# Patient Record
Sex: MALE | Race: WHITE | NOT HISPANIC OR LATINO | Employment: FULL TIME | URBAN - METROPOLITAN AREA
[De-identification: names, ages, dates, MRNs, and addresses within clinical notes are randomized per-mention and may not be internally consistent; named-entity substitution may affect disease eponyms.]

---

## 2017-07-22 ENCOUNTER — HOSPITAL ENCOUNTER (EMERGENCY)
Facility: HOSPITAL | Age: 27
Discharge: HOME | End: 2017-07-23
Attending: EMERGENCY MEDICINE

## 2017-07-22 DIAGNOSIS — R52 PAIN: ICD-10-CM

## 2017-07-22 DIAGNOSIS — M25.571 ACUTE RIGHT ANKLE PAIN: Primary | ICD-10-CM

## 2017-07-22 PROCEDURE — 29515 APPLICATION SHORT LEG SPLINT: CPT

## 2017-07-22 PROCEDURE — 99283 EMERGENCY DEPT VISIT LOW MDM: CPT | Mod: 25

## 2017-07-23 VITALS
OXYGEN SATURATION: 99 % | TEMPERATURE: 98 F | HEART RATE: 78 BPM | WEIGHT: 230 LBS | RESPIRATION RATE: 16 BRPM | SYSTOLIC BLOOD PRESSURE: 142 MMHG | DIASTOLIC BLOOD PRESSURE: 78 MMHG | HEIGHT: 75 IN | BODY MASS INDEX: 28.6 KG/M2

## 2017-07-23 PROCEDURE — 25000003 PHARM REV CODE 250: Performed by: EMERGENCY MEDICINE

## 2017-07-23 RX ORDER — HYDROCODONE BITARTRATE AND ACETAMINOPHEN 10; 325 MG/1; MG/1
1 TABLET ORAL EVERY 4 HOURS PRN
Qty: 18 TABLET | Refills: 0 | Status: SHIPPED | OUTPATIENT
Start: 2017-07-23 | End: 2018-02-01

## 2017-07-23 RX ORDER — HYDROCODONE BITARTRATE AND ACETAMINOPHEN 10; 325 MG/1; MG/1
1 TABLET ORAL
Status: COMPLETED | OUTPATIENT
Start: 2017-07-23 | End: 2017-07-23

## 2017-07-23 RX ADMIN — HYDROCODONE BITARTRATE AND ACETAMINOPHEN 1 TABLET: 10; 325 TABLET ORAL at 12:07

## 2017-07-23 NOTE — ED NOTES
Pt to ER with c/o R foot/ankle pain onset earlier yesterday.  Pt evaluated at [facility] earlier tonight.  Here for secondopinion.

## 2017-07-23 NOTE — ED PROVIDER NOTES
SCRIBE #1 NOTE: I, Elias Garcia, am scribing for, and in the presence of, Daniele Horowitz MD. I have scribed the entire note.      History      Chief Complaint   Patient presents with    Ankle Pain     right ankle pain today. Was seen at [facility] earlier but symptoms have gotten worse       Review of patient's allergies indicates:   Allergen Reactions    Keflex [cephalexin]     Pcn [penicillins]         HPI   HPI    7/22/2017, 11:37 PM   History obtained from the patient      History of Present Illness: Reji Talbert Jr. is a 27 y.o. male patient who presents to the Emergency Department nothing for an evaluation of right ankle pain which onset suddenly today round 1400. Pt reports he was evaluated earlier today at [facility], but since then he states his sx have worsened. Symptoms are constant and moderate in severity. Exacerbated by nothing and relieved by nothing. Patient denies any weakness, numbness, decreased ROM, and all other sxs at this time. No further complaints or concerns at this time.     Arrival mode: Personal vehicle    PCP: Primary Doctor No       Past Medical History:  Past Medical History:   Diagnosis Date    Asthma     childhood    Heart attack     Tobacco use        Past Surgical History:  Past Surgical History:   Procedure Laterality Date    ADENOIDECTOMY      ANKLE SURGERY      TONSILLECTOMY           Family History:  Family History   Problem Relation Age of Onset    Hypertension      Coronary artery disease         Social History:  Social History     Social History Main Topics    Smoking status: Current Every Day Smoker     Packs/day: 1.00     Types: Cigarettes    Smokeless tobacco: Unknown    Alcohol use Yes    Drug use: No    Sexual activity: Yes     Partners: Female       ROS   Review of Systems   Constitutional: Negative for chills and fever.   HENT: Negative for sore throat.    Respiratory: Negative for shortness of breath.    Cardiovascular: Negative for chest pain.    Gastrointestinal: Negative for abdominal pain, nausea and vomiting.   Genitourinary: Negative for dysuria and hematuria.   Musculoskeletal: Positive for arthralgias (Right ankle). Negative for back pain, gait problem, joint swelling, neck pain and neck stiffness.   Skin: Negative for rash.   Neurological: Negative for dizziness, weakness, light-headedness, numbness and headaches.   Hematological: Does not bruise/bleed easily.     Physical Exam      Initial Vitals [07/22/17 2325]   BP Pulse Resp Temp SpO2   (!) 165/81 82 18 97.9 °F (36.6 °C) 100 %      MAP       109          Physical Exam  Nursing Notes and Vital Signs Reviewed.  Constitutional: Patient is in no acute distress. Well-developed and well-nourished.  Head: Atraumatic. Normocephalic.  Eyes: PERRL. EOM intact. Conjunctivae are not pale. No scleral icterus.  ENT: Mucous membranes are moist. Oropharynx is clear and symmetric.    Neck: Supple. Full ROM. No lymphadenopathy.  Cardiovascular: Regular rate. Regular rhythm.  Pulmonary/Chest: No respiratory distress. Clear to auscultation bilaterally. No wheezing, rales, or rhonchi.  Abdominal: Soft and non-distended.  There is no tenderness.  Musculoskeletal: Moves all extremities. No obvious deformities.  Right Ankle:  Swelling to lateral malleolus with diffuse tenderness laterally. Decreased ROM secondary to pain noted.  Intact sensation to light touch. Distal capillary refill takes less than 2 seconds.  PT and DP pulses are 2+ bilaterally.  Skin: Warm and dry.  Neurological:  Alert, awake, and appropriate.  Normal speech.  No acute focal neurological deficits are appreciated.  Psychiatric: Normal affect. Good eye contact. Appropriate in content.    ED Course    Splint Application  Date/Time: 7/23/2017 12:48 AM  Performed by: WILI BARONE.  Authorized by: WILI BARONE.   Location details: right ankle  Splint type: Walking boot.  Post-procedure: The splinted body part was neurovascularly unchanged following  "the procedure.  Patient tolerance: Patient tolerated the procedure well with no immediate complications        ED Vital Signs:  Vitals:    07/22/17 2325 07/23/17 0106   BP: (!) 165/81 (!) 142/78   Pulse: 82 78   Resp: 18 16   Temp: 97.9 °F (36.6 °C) 98 °F (36.7 °C)   TempSrc: Oral Oral   SpO2: 100% 99%   Weight: 104.3 kg (230 lb)    Height: 6' 3" (1.905 m)        Imaging Results:    Per [facility] radiology, pt's X-ray right ankle results: No acute findings.          The Emergency Provider reviewed the vital signs and test results, which are outlined above.    ED Discussion     12:45 PM: Reassessed pt at this time. Pt is awake, alert, and in NAD. Pt states his condition has  at this time. Discussed with pt all pertinent ED information and results. Discussed pt dx of right ankle pain and plan of tx. Gave pt all f/u and return to the ED instructions. All questions and concerns were addressed at this time. Pt expresses understanding of information and instructions, and is comfortable with plan to discharge. Pt is stable for discharge.    I discussed with patient and/or family/caretaker that evaluation in the ED does not suggest any emergent or life threatening medical conditions requiring immediate intervention beyond what was provided in the ED, and I believe patient is safe for discharge.  Regardless, an unremarkable evaluation in the ED does not preclude the development or presence of a serious of life threatening condition. As such, patient was instructed to return immediately for any worsening or change in current symptoms.      ED Medication(s):  Medications   hydrocodone-acetaminophen 10-325mg per tablet 1 tablet (1 tablet Oral Given 7/23/17 0055)       Discharge Medication List as of 7/23/2017 12:50 AM      START taking these medications    Details   hydrocodone-acetaminophen 10-325mg (NORCO)  mg Tab Take 1 tablet by mouth every 4 (four) hours as needed for Pain., Starting Sun 7/23/2017, Print       "       Follow-up Information     [facility] In 2 days.    Specialty:  Internal Medicine  Contact information:  [address]  [facility] [address] [phone]  Additional information:  (off [facility]) 1st floor           [facility]    Specialty:  Emergency Medicine  Why:  If symptoms worsen  Contact information:  [address]  [facility] [address] [phone]                   Medical Decision Making    Medical Decision Making:   Clinical Tests:   Radiological Study: Ordered and Reviewed           Scribe Attestation:   Scribe #1: I performed the above scribed service and the documentation accurately describes the services I performed. I attest to the accuracy of the note.    Attending:   Physician Attestation Statement for Scribe #1: I, Daniele Horowitz MD, personally performed the services described in this documentation, as scribed by Elias Garcia, in my presence, and it is both accurate and complete.          Clinical Impression       ICD-10-CM ICD-9-CM   1. Acute right ankle pain M25.571 719.47     338.19   2. Pain R52 780.96       Disposition:   Disposition: Discharged  Condition: Stable         Daniele Horowitz MD  07/24/17 0519

## 2017-09-13 ENCOUNTER — HOSPITAL ENCOUNTER (EMERGENCY)
Facility: HOSPITAL | Age: 27
Discharge: HOME | End: 2017-09-13

## 2017-09-13 VITALS
OXYGEN SATURATION: 100 % | BODY MASS INDEX: 28.85 KG/M2 | SYSTOLIC BLOOD PRESSURE: 135 MMHG | WEIGHT: 232 LBS | DIASTOLIC BLOOD PRESSURE: 68 MMHG | RESPIRATION RATE: 18 BRPM | HEART RATE: 69 BPM | HEIGHT: 75 IN | TEMPERATURE: 98 F

## 2017-09-13 DIAGNOSIS — L02.31 ABSCESS OF BUTTOCK, LEFT: Primary | ICD-10-CM

## 2017-09-13 PROCEDURE — 99283 EMERGENCY DEPT VISIT LOW MDM: CPT

## 2017-09-13 RX ORDER — SULFAMETHOXAZOLE AND TRIMETHOPRIM 800; 160 MG/1; MG/1
1 TABLET ORAL
COMMUNITY

## 2017-09-13 NOTE — ED PROVIDER NOTES
"SCRIBE #1 NOTE: I, Elias Jose, am scribing for, and in the presence of, JIMMIE Diop. I have scribed the entire note.      History      Chief Complaint   Patient presents with    Abscess     L buttocks, pt states it was lanced at [facility] but it has gotten worse and "outside of the lines" they thor       Review of patient's allergies indicates:   Allergen Reactions    Keflex [cephalexin] Rash    Pcn [penicillins] Rash        HPI   HPI    9/13/2017, 5:15 PM   History obtained from the patient      History of Present Illness: Reji Talbert Jr. is a 27 y.o. male patient who is 2 days s/p I & D of a skin abscess to his left buttock, done at [facility]. He is currently on Bactrim DS. He presents to the Emergency Department today requesting wound re-check. He states that there is an area of redness that may be beyond the marked border, so he became concerned that the infection may be spreading. He states that he thought that it was healing well until now, because the pain, redness, and swelling seemed to be subsiding. Pt denies any fever, chills, increased warmth/erythema/swelling to the area, drainage from the site, numbness/tingling as well as any other sx.       Arrival mode: Personal vehicle    PCP: Primary Doctor No       Past Medical History:  Past Medical History:   Diagnosis Date    Asthma     childhood    Heart attack     Tobacco use        Past Surgical History:  Past Surgical History:   Procedure Laterality Date    ADENOIDECTOMY      ANKLE SURGERY      TONSILLECTOMY           Family History:  Family History   Problem Relation Age of Onset    Hypertension      Coronary artery disease         Social History:  Social History     Social History Main Topics    Smoking status: Current Every Day Smoker     Packs/day: 1.00     Types: Cigarettes    Smokeless tobacco: Unknown    Alcohol use Yes    Drug use: No    Sexual activity: Yes     Partners: Female       ROS   Review of Systems "   Constitutional: Negative for chills, fever and unexpected weight change.   HENT: Negative for facial swelling and sore throat.    Respiratory: Negative for cough, shortness of breath, wheezing and stridor.    Cardiovascular: Negative for chest pain.   Gastrointestinal: Negative for abdominal pain, constipation, diarrhea, nausea, rectal pain and vomiting.   Endocrine: Negative for polydipsia and polyuria.   Musculoskeletal: Negative for arthralgias and myalgias.   Skin: Positive for wound.        (+) Abscess  (-) Swelling  (-) Drainage   Allergic/Immunologic: Negative for immunocompromised state.   Neurological: Negative for dizziness, weakness, light-headedness, numbness and headaches.   Hematological: Negative for adenopathy.   Psychiatric/Behavioral: Negative for confusion.     Physical Exam      Initial Vitals [09/13/17 1629]   BP Pulse Resp Temp SpO2   135/68 69 18 98.2 °F (36.8 °C) 100 %      MAP       90.33          Physical Exam  Nursing Notes and Vital Signs Reviewed.  Constitutional: Patient is in no acute distress. Well-developed and well-nourished.  Head: Normocephalic.  ENT: Mucous membranes are moist.   Neck: Supple  Cardiovascular: Regular rate. Regular rhythm.   Pulmonary/Chest: No respiratory distress.   Abdominal: Soft and non-tender   Musculoskeletal: Moves all extremities.  Skin: Warm and dry. Sub-acute, nearly healed, superficial cutaneous abscess s/p I & D, no packing present, located to left mid buttock. Measures less than 2 cm. No fluctuance, induration, cellulitis, or lymphangitis. No drainage. No pilonidal. No perirectal or perianal abscess. No necrosis.   Neurological:  Alert, awake, and appropriate. Normal speech. No acute focal neurological deficits are appreciated.  Psychiatric: Normal affect. Good eye contact. Appropriate in content.    ED Course    Procedures  ED Vital Signs:  Vitals:    09/13/17 1629   BP: 135/68   Pulse: 69   Resp: 18   Temp: 98.2 °F (36.8 °C)   TempSrc: Oral  "  SpO2: 100%   Weight: 105.2 kg (232 lb)   Height: 6' 3" (1.905 m)            The Emergency Provider reviewed the vital signs and test results, which are outlined above.    ED Discussion      5:35 PM: Reassessed pt at this time. Pt is awake, alert, and in NAD. Discussed with pt all pertinent ED information. Discussed pt dx of abscess of left buttock and plan of tx. Gave pt all f/u and return to the ED instructions. All questions and concerns were addressed at this time. Pt expresses understanding of information and instructions, and is comfortable with plan to discharge. Pt is stable for discharge.    I discussed wound care precautions with patient and/or family/caretaker; specifically that all wounds have risk of infection despite efforts to cleanse and debride the wound; and there is a risk of an occult foreign body (and thus increased risk of infection) despite a negative examination.  I discussed with patient need to return for any signs of infection, specifically redness, increased pain, fever, drainage of pus, or any concern, immediately.      ED Medication(s):  Medications - No data to display    New Prescriptions    No medications on file       Follow-up Information     [facility]    Specialty:  Emergency Medicine  Why:  If symptoms worsen in any way. Continue Bactrim as directed. Keep skin clean and dry. Follow up with primary care in the next 1-2 days for re-check.  Contact information:  [address] [facility] [address] [phone]                   Medical Decision Making              Scribe Attestation:   Scribe #1: I performed the above scribed service and the documentation accurately describes the services I performed. I attest to the accuracy of the note.    Attending:   Physician Attestation Statement for Scribe #1: I, JIMMIE Diop, personally performed the services described in this documentation, as scribed by Elias Garcia, in my presence, and " it is both accurate and complete.          Clinical Impression       ICD-10-CM ICD-9-CM   1. Abscess of buttock, left L02.31 682.5       Disposition:   Disposition: Discharged  Condition: Stable         Antonio Lafleur PA-C  09/13/17 5712

## 2018-01-29 ENCOUNTER — HOSPITAL ENCOUNTER (EMERGENCY)
Facility: HOSPITAL | Age: 28
Discharge: HOME | End: 2018-01-29
Attending: EMERGENCY MEDICINE

## 2018-01-29 VITALS
WEIGHT: 215 LBS | OXYGEN SATURATION: 100 % | TEMPERATURE: 99 F | RESPIRATION RATE: 18 BRPM | BODY MASS INDEX: 26.87 KG/M2 | HEART RATE: 67 BPM | SYSTOLIC BLOOD PRESSURE: 120 MMHG | DIASTOLIC BLOOD PRESSURE: 78 MMHG

## 2018-01-29 DIAGNOSIS — J02.0 STREP PHARYNGITIS: Primary | ICD-10-CM

## 2018-01-29 DIAGNOSIS — J06.9 UPPER RESPIRATORY TRACT INFECTION, UNSPECIFIED TYPE: ICD-10-CM

## 2018-01-29 LAB
FLUAV AG SPEC QL IA: NEGATIVE
FLUBV AG SPEC QL IA: NEGATIVE
SPECIMEN SOURCE: NORMAL

## 2018-01-29 PROCEDURE — 99283 EMERGENCY DEPT VISIT LOW MDM: CPT

## 2018-01-29 PROCEDURE — 87400 INFLUENZA A/B EACH AG IA: CPT | Mod: 59

## 2018-01-29 RX ORDER — PREDNISONE 20 MG/1
40 TABLET ORAL DAILY
Qty: 10 TABLET | Refills: 0 | Status: SHIPPED | OUTPATIENT
Start: 2018-01-29 | End: 2018-02-03

## 2018-01-29 RX ORDER — AZITHROMYCIN 250 MG/1
250 TABLET, FILM COATED ORAL DAILY
Qty: 6 TABLET | Refills: 0 | Status: SHIPPED | OUTPATIENT
Start: 2018-01-29 | End: 2018-09-23 | Stop reason: ALTCHOICE

## 2018-01-29 NOTE — ED PROVIDER NOTES
SCRIBE #1 NOTE: I, Gage Torres, am scribing for, and in the presence of, Dom Recio Jr., MD. I have scribed the entire note.      History      Chief Complaint   Patient presents with    Fever     cough, fever, boday aches, vomiting and diarrhea       Review of patient's allergies indicates:   Allergen Reactions    Keflex [cephalexin] Rash    Pcn [penicillins] Rash        HPI   HPI    1/29/2018, 8:03 AM   History obtained from the patient      History of Present Illness: Reji Talbert Jr. is a 27 y.o. male patient who presents to the Emergency Department for fever (TMAX 101.4) which onset gradually 3 days ago. Symptoms are intermittent and moderate in severity. Sx are exacerbated by nothing and relieved with tylenol/ibuprofen. Associated sxs include N/V/D and sore throat. Pt reports taking imodium and OTC medications for diarrhea and pt reports no relief. Pt reports taking 500 mg of tylenol 30 minutes PTA. No other sxs reported. Pt states his wife and son had the same sxs at home last week. Patient denies any chills, abd pain, hematochezia, hematemesis, difficulty swallowing, dizziness, lightheadedness, dysuria, difficulty urinating, cough, congestion, CP, SOB and all other sxs at this time. No further complaints or concerns at this time.     Arrival mode: Personal vehicle     PCP: Primary Doctor No       Past Medical History:  Past Medical History:   Diagnosis Date    Asthma     childhood    Heart attack     Tobacco use        Past Surgical History:  Past Surgical History:   Procedure Laterality Date    ADENOIDECTOMY      ANKLE SURGERY      TONSILLECTOMY           Family History:  Family History   Problem Relation Age of Onset    Hypertension      Coronary artery disease         Social History:  Social History     Social History Main Topics    Smoking status: Current Every Day Smoker     Packs/day: 1.00     Types: Cigarettes    Smokeless tobacco: Never Used    Alcohol use Yes    Drug  use: No    Sexual activity: Yes     Partners: Female       ROS   Review of Systems   Constitutional: Positive for fever. Negative for chills.   HENT: Positive for sore throat. Negative for congestion, ear discharge, ear pain, facial swelling, rhinorrhea, sinus pressure and trouble swallowing.    Respiratory: Negative for chest tightness and shortness of breath.    Cardiovascular: Negative for chest pain.   Gastrointestinal: Positive for diarrhea, nausea and vomiting. Negative for abdominal pain and constipation.   Genitourinary: Negative for dysuria.   Musculoskeletal: Negative for back pain and neck pain.   Skin: Negative for rash.   Neurological: Negative for dizziness, weakness, light-headedness, numbness and headaches.   Hematological: Does not bruise/bleed easily.   Psychiatric/Behavioral: Negative for agitation and confusion.   All other systems reviewed and are negative.      Physical Exam      Initial Vitals [01/29/18 0758]   BP Pulse Resp Temp SpO2   125/85 74 18 98.4 °F (36.9 °C) 100 %      MAP       98.33          Physical Exam  Nursing Notes and Vital Signs Reviewed.  Constitutional: Patient is in no apparent distress. Well-developed and well-nourished.  Head: Atraumatic. Normocephalic.  Eyes: PERRL. EOM intact. Conjunctivae are not pale. No scleral icterus.  ENT: Mucous membranes are moist. Oropharynx is clear and symmetric.    Neck: Supple. Full ROM. No lymphadenopathy.  Cardiovascular: Regular rate. Regular rhythm. No murmurs, rubs, or gallops. Distal pulses are 2+ and symmetric.  Pulmonary/Chest: No respiratory distress. Clear to auscultation bilaterally. No wheezing or rales.  Abdominal: Soft and non-distended.  There is no tenderness.  No rebound, guarding, or rigidity. Good bowel sounds.  Musculoskeletal: Moves all extremities. No obvious deformities. No edema. No calf tenderness.  Skin: Warm and dry.  Neurological:  Alert, awake, and appropriate.  Normal speech.  No acute focal neurological  deficits are appreciated.  Psychiatric: Normal affect. Good eye contact. Appropriate in content.    ED Course    Procedures  ED Vital Signs:  Vitals:    01/29/18 0758 01/29/18 0850 01/29/18 0857   BP: 125/85 120/78    Pulse: 74 67    Resp: 18     Temp: 98.4 °F (36.9 °C)  98.5 °F (36.9 °C)   TempSrc: Oral  Oral   SpO2: 100% 100%    Weight: 97.5 kg (215 lb)         Abnormal Lab Results:  Labs Reviewed   INFLUENZA A AND B ANTIGEN        All Lab Results:  Results for orders placed or performed during the hospital encounter of 01/29/18   Influenza antigen Nasopharyngeal Swab   Result Value Ref Range    Influenza A Ag, EIA Negative Negative    Influenza B Ag, EIA Negative Negative    Flu A & B Source Nasopharyngeal Swab          Imaging Results:  Imaging Results    None                 The Emergency Provider reviewed the vital signs and test results, which are outlined above.    ED Discussion     10:15 AM: Reassessed pt at this time. Pt is laying comfortably in ED bed and in NAD. Pt is awake, alert, and oriented. Discussed with pt all pertinent ED information and results. Discussed pt dx and plan of tx. Gave pt all f/u and return to the ED instructions. All questions and concerns were addressed at this time. Pt expresses understanding of information and instructions, and is comfortable with plan to discharge. Pt is stable for discharge.      I discussed with patient and/or family/caretaker that evaluation in the ED does not suggest any emergent or life threatening medical conditions requiring immediate intervention beyond what was provided in the ED, and I believe patient is safe for discharge.  Regardless, an unremarkable evaluation in the ED does not preclude the development or presence of a serious of life threatening condition. As such, patient was instructed to return immediately for any worsening or change in current symptoms.        ED Medication(s):  Medications - No data to display    Discharge Medication List as  of 1/29/2018  8:47 AM      START taking these medications    Details   azithromycin (Z-MAYRA) 250 MG tablet Take 1 tablet (250 mg total) by mouth once daily. Take first 2 tablets together, then 1 every day until finished., Starting Mon 1/29/2018, Print      predniSONE (DELTASONE) 20 MG tablet Take 2 tablets (40 mg total) by mouth once daily., Starting Mon 1/29/2018, Until Sat 2/3/2018, Print             Follow-up Information     PCP. Call in 2 days.                   Medical Decision Making    Medical Decision Making:   Clinical Tests:   Lab Tests: Ordered and Reviewed           Scribe Attestation:   Scribe #1: I performed the above scribed service and the documentation accurately describes the services I performed. I attest to the accuracy of the note.    Attending:   Physician Attestation Statement for Scribe #1: I, Dom Recio Jr., MD, personally performed the services described in this documentation, as scribed by Gage Torres, in my presence, and it is both accurate and complete.          Clinical Impression       ICD-10-CM ICD-9-CM   1. Strep pharyngitis J02.0 034.0   2. Upper respiratory tract infection, unspecified type J06.9 465.9       Disposition:   Disposition: Discharged  Condition: Stable         Dom Recio Jr., MD  01/29/18 2975

## 2018-01-31 PROCEDURE — 99284 EMERGENCY DEPT VISIT MOD MDM: CPT | Mod: 25

## 2018-01-31 PROCEDURE — 96374 THER/PROPH/DIAG INJ IV PUSH: CPT

## 2018-02-01 ENCOUNTER — HOSPITAL ENCOUNTER (EMERGENCY)
Facility: HOSPITAL | Age: 28
Discharge: HOME | End: 2018-02-01
Attending: EMERGENCY MEDICINE

## 2018-02-01 VITALS
HEART RATE: 75 BPM | WEIGHT: 212 LBS | OXYGEN SATURATION: 100 % | RESPIRATION RATE: 18 BRPM | BODY MASS INDEX: 26.36 KG/M2 | HEIGHT: 75 IN | DIASTOLIC BLOOD PRESSURE: 74 MMHG | TEMPERATURE: 99 F | SYSTOLIC BLOOD PRESSURE: 132 MMHG
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
## 2018-02-01 DIAGNOSIS — T07.XXXA CONTUSION, MULTIPLE SITES: ICD-10-CM

## 2018-02-01 LAB
ALBUMIN SERPL BCP-MCNC: 4.2 G/DL
ALP SERPL-CCNC: 55 U/L
ALT SERPL W/O P-5'-P-CCNC: 30 U/L
ANION GAP SERPL CALC-SCNC: 10 MMOL/L
APTT BLDCRRT: 24.9 SEC
AST SERPL-CCNC: 49 U/L
BASOPHILS # BLD AUTO: 0.01 K/UL
BASOPHILS NFR BLD: 0.1 %
BILIRUB SERPL-MCNC: 0.5 MG/DL
BUN SERPL-MCNC: 18 MG/DL
CALCIUM SERPL-MCNC: 9.7 MG/DL
CHLORIDE SERPL-SCNC: 105 MMOL/L
CO2 SERPL-SCNC: 24 MMOL/L
CREAT SERPL-MCNC: 1.2 MG/DL
DIFFERENTIAL METHOD: ABNORMAL
EOSINOPHIL # BLD AUTO: 0 K/UL
EOSINOPHIL NFR BLD: 0.2 %
ERYTHROCYTE [DISTWIDTH] IN BLOOD BY AUTOMATED COUNT: 14.1 %
EST. GFR  (AFRICAN AMERICAN): >60 ML/MIN/1.73 M^2
EST. GFR  (NON AFRICAN AMERICAN): >60 ML/MIN/1.73 M^2
GLUCOSE SERPL-MCNC: 106 MG/DL
HCT VFR BLD AUTO: 41.4 %
HGB BLD-MCNC: 14.6 G/DL
INR PPP: 1
LYMPHOCYTES # BLD AUTO: 1.8 K/UL
LYMPHOCYTES NFR BLD: 14.3 %
MCH RBC QN AUTO: 30.5 PG
MCHC RBC AUTO-ENTMCNC: 35.3 G/DL
MCV RBC AUTO: 86 FL
MONOCYTES # BLD AUTO: 0.7 K/UL
MONOCYTES NFR BLD: 5.7 %
NEUTROPHILS # BLD AUTO: 10.2 K/UL
NEUTROPHILS NFR BLD: 79.7 %
PLATELET # BLD AUTO: 181 K/UL
PMV BLD AUTO: 9.8 FL
POTASSIUM SERPL-SCNC: 4.1 MMOL/L
PROT SERPL-MCNC: 7 G/DL
PROTHROMBIN TIME: 10.1 SEC
RBC # BLD AUTO: 4.79 M/UL
SODIUM SERPL-SCNC: 139 MMOL/L
WBC # BLD AUTO: 12.84 K/UL

## 2018-02-01 PROCEDURE — 25500020 PHARM REV CODE 255: Performed by: EMERGENCY MEDICINE

## 2018-02-01 PROCEDURE — 80053 COMPREHEN METABOLIC PANEL: CPT

## 2018-02-01 PROCEDURE — 85610 PROTHROMBIN TIME: CPT

## 2018-02-01 PROCEDURE — 63600175 PHARM REV CODE 636 W HCPCS: Performed by: EMERGENCY MEDICINE

## 2018-02-01 PROCEDURE — 25000003 PHARM REV CODE 250: Performed by: EMERGENCY MEDICINE

## 2018-02-01 PROCEDURE — 85730 THROMBOPLASTIN TIME PARTIAL: CPT

## 2018-02-01 PROCEDURE — 85025 COMPLETE CBC W/AUTO DIFF WBC: CPT

## 2018-02-01 RX ORDER — HYDROCODONE BITARTRATE AND ACETAMINOPHEN 7.5; 325 MG/1; MG/1
1 TABLET ORAL EVERY 4 HOURS PRN
Qty: 15 TABLET | Refills: 0 | Status: SHIPPED | OUTPATIENT
Start: 2018-02-01 | End: 2018-09-23 | Stop reason: ALTCHOICE

## 2018-02-01 RX ORDER — KETOROLAC TROMETHAMINE 30 MG/ML
30 INJECTION, SOLUTION INTRAMUSCULAR; INTRAVENOUS
Status: COMPLETED | OUTPATIENT
Start: 2018-02-01 | End: 2018-02-01

## 2018-02-01 RX ORDER — NAPROXEN 375 MG/1
375 TABLET ORAL 2 TIMES DAILY WITH MEALS
Qty: 14 TABLET | Refills: 0 | Status: SHIPPED | OUTPATIENT
Start: 2018-02-01 | End: 2018-09-23 | Stop reason: ALTCHOICE

## 2018-02-01 RX ADMIN — SODIUM CHLORIDE 1000 ML: 0.9 INJECTION, SOLUTION INTRAVENOUS at 02:02

## 2018-02-01 RX ADMIN — IOHEXOL 75 ML: 350 INJECTION, SOLUTION INTRAVENOUS at 01:02

## 2018-02-01 RX ADMIN — KETOROLAC TROMETHAMINE 30 MG: 30 INJECTION, SOLUTION INTRAMUSCULAR at 02:02
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
## 2018-09-23 ENCOUNTER — HOSPITAL ENCOUNTER (EMERGENCY)
Facility: HOSPITAL | Age: 28
Discharge: HOME | End: 2018-09-23
Attending: EMERGENCY MEDICINE
Payer: MEDICAID

## 2018-09-23 VITALS
TEMPERATURE: 99 F | RESPIRATION RATE: 17 BRPM | SYSTOLIC BLOOD PRESSURE: 150 MMHG | BODY MASS INDEX: 22.97 KG/M2 | DIASTOLIC BLOOD PRESSURE: 85 MMHG | HEART RATE: 88 BPM | OXYGEN SATURATION: 100 % | HEIGHT: 75 IN | WEIGHT: 184.75 LBS

## 2018-09-23 DIAGNOSIS — Z20.2 EXPOSURE TO STD: Primary | ICD-10-CM

## 2018-09-23 DIAGNOSIS — Z72.51 HIGH RISK SEXUAL BEHAVIOR: ICD-10-CM

## 2018-09-23 DIAGNOSIS — R03.0 ELEVATED BLOOD PRESSURE READING: ICD-10-CM

## 2018-09-23 PROCEDURE — 87491 CHLMYD TRACH DNA AMP PROBE: CPT

## 2018-09-23 PROCEDURE — 63600175 PHARM REV CODE 636 W HCPCS: Performed by: REGISTERED NURSE

## 2018-09-23 PROCEDURE — 25000003 PHARM REV CODE 250: Performed by: REGISTERED NURSE

## 2018-09-23 PROCEDURE — 99283 EMERGENCY DEPT VISIT LOW MDM: CPT | Mod: 25

## 2018-09-23 PROCEDURE — 96372 THER/PROPH/DIAG INJ SC/IM: CPT

## 2018-09-23 RX ORDER — AZITHROMYCIN 250 MG/1
1000 TABLET, FILM COATED ORAL
Status: COMPLETED | OUTPATIENT
Start: 2018-09-23 | End: 2018-09-23

## 2018-09-23 RX ORDER — CEFTRIAXONE 250 MG/1
250 INJECTION, POWDER, FOR SOLUTION INTRAMUSCULAR; INTRAVENOUS
Status: COMPLETED | OUTPATIENT
Start: 2018-09-23 | End: 2018-09-23

## 2018-09-23 RX ORDER — LISINOPRIL 30 MG/1
20 TABLET ORAL DAILY
COMMUNITY

## 2018-09-23 RX ORDER — DIPHENHYDRAMINE HCL 25 MG
25 CAPSULE ORAL
Status: COMPLETED | OUTPATIENT
Start: 2018-09-23 | End: 2018-09-23

## 2018-09-23 RX ADMIN — CEFTRIAXONE SODIUM 250 MG: 250 INJECTION, POWDER, FOR SOLUTION INTRAMUSCULAR; INTRAVENOUS at 02:09

## 2018-09-23 RX ADMIN — AZITHROMYCIN 1000 MG: 250 TABLET, FILM COATED ORAL at 02:09

## 2018-09-23 RX ADMIN — DIPHENHYDRAMINE HYDROCHLORIDE 25 MG: 25 CAPSULE ORAL at 02:09

## 2018-09-23 NOTE — ED PROVIDER NOTES
HISTORY     Chief Complaint   Patient presents with    Exposure to STD     pt states wife is currently upstairs and is being treated for chlamydia and her nurse told pt to come to ER to be treated as well     Review of patient's allergies indicates:   Allergen Reactions    Keflex [cephalexin] Rash    Pcn [penicillins] Rash        HPI   The history is provided by the patient.   General Illness    The current episode started just prior to arrival. Pertinent negatives include no fever, no nausea, no sore throat, no shortness of breath and no rash.   Pt reports to ED for exposure to gonorrhea and chlamydia. Pt denies any penile discharge, fever, dysuria, abdominal pain, NVD or any other symptoms at this time.      PCP: Primary Doctor No     Past Medical History:  Past Medical History:   Diagnosis Date    Asthma     childhood    Heart attack     Hypertension     Tobacco use         Past Surgical History:  Past Surgical History:   Procedure Laterality Date    ADENOIDECTOMY      ANKLE SURGERY      TONSILLECTOMY          Family History:  Family History   Problem Relation Age of Onset    Hypertension Unknown     Coronary artery disease Unknown         Social History:  Social History     Tobacco Use    Smoking status: Current Every Day Smoker     Packs/day: 1.00     Types: Cigarettes    Smokeless tobacco: Never Used   Substance and Sexual Activity    Alcohol use: Yes    Drug use: No    Sexual activity: Yes     Partners: Female         ROS   Review of Systems   Constitutional: Negative for fever.   HENT: Negative for sore throat.    Respiratory: Negative for shortness of breath.    Cardiovascular: Negative for chest pain.   Gastrointestinal: Negative for nausea.   Genitourinary: Negative for dysuria.        + exposure to STD    Musculoskeletal: Negative for back pain.   Skin: Negative for rash.   Neurological: Negative for weakness.   Hematological: Does not bruise/bleed easily.   All other systems  "reviewed and are negative.      PHYSICAL EXAM     Initial Vitals [09/23/18 0141]   BP Pulse Resp Temp SpO2   (!) 167/95 87 18 98.6 °F (37 °C) 100 %      MAP       --           Physical Exam    Constitutional: He appears well-developed and well-nourished.   HENT:   Head: Normocephalic and atraumatic.   Eyes: EOM are normal. Pupils are equal, round, and reactive to light.   Neck: Normal range of motion. Neck supple.   Cardiovascular: Normal rate, regular rhythm and normal heart sounds.   Pulmonary/Chest: Breath sounds normal. No respiratory distress. He has no wheezes.   Abdominal: Soft. Bowel sounds are normal.   Musculoskeletal: Normal range of motion.   Neurological: He is alert and oriented to person, place, and time.   Skin: Skin is warm and dry. Capillary refill takes less than 2 seconds.   Psychiatric: He has a normal mood and affect.          ED COURSE   Procedures  ED ONGOING VITALS:  Vitals:    09/23/18 0141 09/23/18 0309   BP: (!) 167/95 (!) 150/85   Pulse: 87 88   Resp: 18 17   Temp: 98.6 °F (37 °C) 98.9 °F (37.2 °C)   TempSrc: Oral Oral   SpO2: 100% 100%   Weight: 83.8 kg (184 lb 11.9 oz)    Height: 6' 3" (1.905 m)          ABNORMAL LAB VALUES:  Labs Reviewed   C. TRACHOMATIS/N. GONORRHOEAE BY AMP DNA - Abnormal; Notable for the following components:       Result Value    Chlamydia, Amplified DNA Detected (*)     All other components within normal limits    Narrative:     Resulting Location->[facility]         ALL LAB VALUES:        RADIOLOGY STUDIES:  Imaging Results    None                   The above vital signs and test results have been reviewed by the emergency provider.     ED Medications:  Medications   cefTRIAXone injection 250 mg (250 mg Intramuscular Given 9/23/18 0247)   azithromycin tablet 1,000 mg (1,000 mg Oral Given 9/23/18 0245)   diphenhydrAMINE capsule 25 mg (25 mg Oral Given 9/23/18 0245)       Discharge Medications:  This SmartLink is deprecated. Use Pax8 instead to display the " medication list for a patient.   Follow-up Information     PCP In 1 week.           [facility]    Specialty:  Emergency Medicine  Why:  If symptoms worsen  Contact information:  [address]  [facility] [address] [phone]                2:13 AM: During the initial assessment I discussed pt dx and plan of tx. Gave pt all f/u and return to the ED instructions. All questions and concerns were addressed at this time. Pt expresses understanding of information and instructions, and is comfortable with plan to discharge. Pt is stable for discharge.      Pre-hypertension/Hypertension: The pt has been informed that they may have pre-hypertension or hypertension based on a blood pressure reading in the ED. I recommend that the pt call the PCP listed on their discharge instructions or a physician of their choice this week to arrange f/u for further evaluation of possible pre-hypertension or hypertension.       MEDICAL DECISION MAKING                 CLINICAL IMPRESSION       ICD-10-CM ICD-9-CM   1. Exposure to STD Z20.2 V01.6   2. High risk sexual behavior Z72.51 V69.2   3. Elevated blood pressure reading R03.0 796.2               Stewart Jones Jr., [facility]  09/24/18 3891

## 2018-09-24 LAB
C TRACH DNA SPEC QL NAA+PROBE: DETECTED
N GONORRHOEA DNA SPEC QL NAA+PROBE: NOT DETECTED

## 2018-09-25 ENCOUNTER — TELEPHONE (OUTPATIENT)
Dept: EMERGENCY MEDICINE | Facility: HOSPITAL | Age: 28
End: 2018-09-25

## 2018-09-25 NOTE — TELEPHONE ENCOUNTER
----- Message from Raphael Hendrickson MD sent at 9/24/2018  1:24 PM CDT -----  Patient tested positive for chlamydia.  Patient was treated, needs to be notified, so partner can be treated.

## 2019-12-27 ENCOUNTER — HOSPITAL ENCOUNTER (EMERGENCY)
Facility: HOSPITAL | Age: 29
Discharge: HOME | End: 2019-12-27
Attending: EMERGENCY MEDICINE
Payer: MEDICAID

## 2019-12-27 VITALS
BODY MASS INDEX: 30.71 KG/M2 | HEART RATE: 103 BPM | SYSTOLIC BLOOD PRESSURE: 145 MMHG | HEIGHT: 75 IN | RESPIRATION RATE: 16 BRPM | DIASTOLIC BLOOD PRESSURE: 91 MMHG | WEIGHT: 247 LBS | OXYGEN SATURATION: 97 % | TEMPERATURE: 98 F

## 2019-12-27 DIAGNOSIS — Z20.2 POSSIBLE EXPOSURE TO STD: Primary | ICD-10-CM

## 2019-12-27 LAB
C TRACH DNA SPEC QL NAA+PROBE: NOT DETECTED
N GONORRHOEA DNA SPEC QL NAA+PROBE: NOT DETECTED

## 2019-12-27 PROCEDURE — 96372 THER/PROPH/DIAG INJ SC/IM: CPT

## 2019-12-27 PROCEDURE — 87491 CHLMYD TRACH DNA AMP PROBE: CPT

## 2019-12-27 PROCEDURE — 99284 EMERGENCY DEPT VISIT MOD MDM: CPT | Mod: 25

## 2019-12-27 PROCEDURE — 87661 TRICHOMONAS VAGINALIS AMPLIF: CPT

## 2019-12-27 PROCEDURE — 25000003 PHARM REV CODE 250: Performed by: NURSE PRACTITIONER

## 2019-12-27 PROCEDURE — 63600175 PHARM REV CODE 636 W HCPCS: Performed by: NURSE PRACTITIONER

## 2019-12-27 RX ORDER — AZITHROMYCIN 250 MG/1
1000 TABLET, FILM COATED ORAL ONCE
Status: COMPLETED | OUTPATIENT
Start: 2019-12-27 | End: 2019-12-27

## 2019-12-27 RX ORDER — PROPRANOLOL HYDROCHLORIDE 10 MG/1
10 TABLET ORAL
COMMUNITY

## 2019-12-27 RX ORDER — ONDANSETRON 4 MG/1
4 TABLET, ORALLY DISINTEGRATING ORAL EVERY 6 HOURS PRN
Qty: 15 TABLET | Refills: 0 | Status: SHIPPED | OUTPATIENT
Start: 2019-12-27

## 2019-12-27 RX ORDER — LISINOPRIL 40 MG/1
40 TABLET ORAL 2 TIMES DAILY
COMMUNITY
Start: 2019-06-10

## 2019-12-27 RX ORDER — SERTRALINE HYDROCHLORIDE 100 MG/1
100 TABLET, FILM COATED ORAL
COMMUNITY
Start: 2019-06-27

## 2019-12-27 RX ORDER — BUSPIRONE HYDROCHLORIDE 10 MG/1
10 TABLET ORAL 3 TIMES DAILY
COMMUNITY

## 2019-12-27 RX ORDER — ALBUTEROL SULFATE 90 UG/1
AEROSOL, METERED RESPIRATORY (INHALATION)
COMMUNITY
Start: 2019-05-28

## 2019-12-27 RX ORDER — METRONIDAZOLE 500 MG/1
500 TABLET ORAL EVERY 12 HOURS
Qty: 14 TABLET | Refills: 0 | Status: SHIPPED | OUTPATIENT
Start: 2019-12-27 | End: 2020-01-03

## 2019-12-27 RX ORDER — CEFTRIAXONE 250 MG/1
250 INJECTION, POWDER, FOR SOLUTION INTRAMUSCULAR; INTRAVENOUS
Status: COMPLETED | OUTPATIENT
Start: 2019-12-27 | End: 2019-12-27

## 2019-12-27 RX ADMIN — AZITHROMYCIN MONOHYDRATE 1000 MG: 250 TABLET ORAL at 01:12

## 2019-12-27 RX ADMIN — CEFTRIAXONE SODIUM 250 MG: 250 INJECTION, POWDER, FOR SOLUTION INTRAMUSCULAR; INTRAVENOUS at 01:12

## 2019-12-27 NOTE — DISCHARGE INSTRUCTIONS
Your results for testing will take 48 to 72 hours, abstain from any sexual activity for 10 days, and if cultures positive you will need to notify any sexual partners for the past 60 days. If either/both cultures positive you will need to abstain from intercourse for 10 days (until both you and your partner/s have been treated and free of all symptoms). Return to ER for any fever above 100.4, vomiting, worsening of symptoms, or any concerns.

## 2019-12-27 NOTE — ED PROVIDER NOTES
History      Chief Complaint   Patient presents with    STD CHECK     partner diagnosed with STD, states Dr told him to get checked       Review of patient's allergies indicates:   Allergen Reactions    Pcn [penicillins] Rash        HPI   HPI    12/27/2019, 2:38 AM   History obtained from the girlfriend and patient      History of Present Illness: Reji Talbert Jr. is a 29 y.o. male patient with PMHX MI, asthma, hypertension, tobacco use presents to the Emergency Department with c/o came to ER because his girlfriend was previously diagnosed with Trichomonas and herpes.  Patient denies any dysuria, penile discharge, testicular scrotal pain or swelling, urinary frequency or urgency or hesitancy, fever, nausea vomiting diarrhea, lesions or sores on penis or rectum.  All other sxs at this time. No further complaints or concerns at this time.     Arrival mode: Personal vehicle      PCP: Primary Doctor No       Past Medical History:  Past Medical History:   Diagnosis Date    Asthma     childhood    Heart attack     Hypertension     Tobacco use        Past Surgical History:  Past Surgical History:   Procedure Laterality Date    ADENOIDECTOMY      ANKLE SURGERY      TONSILLECTOMY           Family History:  Family History   Problem Relation Age of Onset    Hypertension Unknown     Coronary artery disease Unknown        Social History:  Social History     Tobacco Use    Smoking status: Current Every Day Smoker     Packs/day: 1.00     Types: Cigarettes    Smokeless tobacco: Never Used   Substance and Sexual Activity    Alcohol use: Yes    Drug use: No    Sexual activity: Yes     Partners: Female       ROS   Review of Systems   Constitutional: Negative for chills and fever.   HENT: Negative for congestion, ear pain, sinus pain and sore throat.    Eyes: Negative for pain.   Respiratory: Negative for cough, chest tightness and shortness of breath.    Cardiovascular: Negative for chest pain.  "  Gastrointestinal: Negative for abdominal pain, constipation, diarrhea, nausea and vomiting.   Genitourinary: Negative for decreased urine volume, difficulty urinating, dysuria, flank pain, frequency and hematuria.   Musculoskeletal: Negative for back pain and neck pain.   Skin: Negative for rash.   Neurological: Negative for dizziness, syncope, weakness and headaches.       Physical Exam      Initial Vitals [12/27/19 0054]   BP Pulse Resp Temp SpO2   (!) 145/91 103 16 98.2 °F (36.8 °C) 97 %      MAP       --          Physical Exam   Constitutional: He is oriented to person, place, and time. He appears well-developed and well-nourished. No distress.   HENT:   Head: Normocephalic and atraumatic.   Mouth/Throat: Oropharynx is clear and moist.   Eyes: Pupils are equal, round, and reactive to light. Conjunctivae and EOM are normal.   Neck: Normal range of motion. Neck supple. No neck rigidity. Normal range of motion present.   Cardiovascular: Normal rate, regular rhythm and normal heart sounds.   Pulmonary/Chest: Effort normal and breath sounds normal. No respiratory distress. He exhibits no tenderness.   Abdominal: Soft. Bowel sounds are normal. There is no tenderness. There is no rigidity, no rebound and no guarding.   Musculoskeletal: Normal range of motion.   Neurological: He is alert and oriented to person, place, and time.   Skin: Skin is warm and dry.       Nursing Notes and Vital Signs Reviewed.    ED Course    Procedures  ED Vital Signs:  Vitals:    12/27/19 0054   BP: (!) 145/91   Pulse: 103   Resp: 16   Temp: 98.2 °F (36.8 °C)   TempSrc: Oral   SpO2: 97%   Weight: 112 kg (247 lb)   Height: 6' 3" (1.905 m)       Abnormal Lab Results:  Labs Reviewed   C. TRACHOMATIS/N. GONORRHOEAE BY AMP DNA   TRICHOMONAS VAGINALIS, RNA,QUAL, URINE   URINALYSIS, REFLEX TO URINE CULTURE   URINALYSIS, REFLEX TO URINE CULTURE        All Lab Results:  Results for orders placed or performed during the hospital encounter of 12/27/19 "   C. trachomatis/N. gonorrhoeae by AMP DNA   Result Value Ref Range    Chlamydia, Amplified DNA Not Detected Not Detected    N gonorrhoeae, amplified DNA Not Detected Not Detected         Imaging Results:  Imaging Results    None                 ED Course as of Dec 27 0320   Fri Dec 27, 2019   0320 The pt refused to wait for trichomonas test results.        [JL]      ED Course User Index  [JL] Radha Mariscal NP        The Emergency Provider reviewed the vital signs and test results, which are outlined above.    ED Discussion     0315: Discussed with pt all results, and the prophylactic treatment plan of STD.  All questions were answered.  The patient understands plan to treat possible STD, follow up with PCP, My Chart, or medical records to obtain the culture results.  PT informed to reframe from any sexual activity for 10 days, and if cultures positive they will need to notify any sexual partners for the past 60 days.  The pt verbalized understanding that treatment does not necessarily mean they are positive for STD's and they will need to obtain culture results in two to three days. Pt is comfortable and in agreement with treatment plan, and stable for discharge home.    I discussed with patient and/or family/caretaker that evaluation in the ED does not suggest any emergent or life threatening medical conditions requiring immediate intervention beyond what was provided in the ED, and I believe patient is safe for discharge.  Regardless, an unremarkable evaluation in the ED does not preclude the development or presence of a serious of life threatening condition. As such, patient was instructed to return immediately for any worsening or change in current symptoms.    ED Medication(s):  Medications   azithromycin tablet 1,000 mg (1,000 mg Oral Given 12/27/19 0155)   cefTRIAXone injection 250 mg (250 mg Intramuscular Given 12/27/19 0156)     Discharge Medication List as of 12/27/2019  3:08 AM      START taking  these medications    Details   metroNIDAZOLE (FLAGYL) 500 MG tablet Take 1 tablet (500 mg total) by mouth every 12 (twelve) hours. for 7 days, Starting Fri 12/27/2019, Until Fri 1/3/2020, Print      ondansetron (ZOFRAN-ODT) 4 MG TbDL Take 1 tablet (4 mg total) by mouth every 6 (six) hours as needed., Starting Fri 12/27/2019, Print            Follow-up Information     [facility] In 3 days.    Why:  Follow up with your doctor for further evaluation, Return to ED for any concerns.  Contact information:  [address]  [address] [place] [id number]  [phone]                       Medical Decision Making                     Clinical Impression       ICD-10-CM ICD-9-CM   1. Possible exposure to STD Z20.2 V01.6       Disposition:   Disposition: Discharged  Condition: Stable         Radha Mariscal NP  12/27/19 3645

## 2019-12-30 LAB
T VAGINALIS RRNA SPEC QL NAA+PROBE: DETECTED
TRICHOMONAS VAGINALIS RNA, QUAL, SOURCE: ABNORMAL

## 2020-04-04 NOTE — ED NOTES
Pt seen, examined and discharged per provider without nursing assessment.     
no back pain, no gout, no musculoskeletal pain, no neck pain, and no weakness.

## 2020-05-09 ENCOUNTER — HOSPITAL ENCOUNTER (EMERGENCY)
Facility: HOSPITAL | Age: 30
Discharge: HOME | End: 2020-05-09
Attending: EMERGENCY MEDICINE
Payer: MEDICAID

## 2020-05-09 VITALS
DIASTOLIC BLOOD PRESSURE: 87 MMHG | OXYGEN SATURATION: 100 % | BODY MASS INDEX: 29.61 KG/M2 | SYSTOLIC BLOOD PRESSURE: 133 MMHG | HEIGHT: 75 IN | TEMPERATURE: 98 F | RESPIRATION RATE: 20 BRPM | WEIGHT: 238.13 LBS | HEART RATE: 86 BPM

## 2020-05-09 DIAGNOSIS — J01.10 ACUTE NON-RECURRENT FRONTAL SINUSITIS: ICD-10-CM

## 2020-05-09 DIAGNOSIS — R51.9 SINUS HEADACHE: Primary | ICD-10-CM

## 2020-05-09 DIAGNOSIS — Z20.822 COVID-19 VIRUS NOT DETECTED: ICD-10-CM

## 2020-05-09 LAB — SARS-COV-2 RDRP RESP QL NAA+PROBE: NEGATIVE

## 2020-05-09 PROCEDURE — 99284 EMERGENCY DEPT VISIT MOD MDM: CPT | Mod: ER

## 2020-05-09 PROCEDURE — U0002 COVID-19 LAB TEST NON-CDC: HCPCS | Mod: ER

## 2020-05-09 PROCEDURE — 86703 HIV-1/HIV-2 1 RESULT ANTBDY: CPT

## 2020-05-09 RX ORDER — AZITHROMYCIN 250 MG/1
250 TABLET, FILM COATED ORAL DAILY
Qty: 6 TABLET | Refills: 0 | Status: SHIPPED | OUTPATIENT
Start: 2020-05-09

## 2020-05-09 RX ORDER — BENZONATATE 100 MG/1
100 CAPSULE ORAL 3 TIMES DAILY PRN
Qty: 20 CAPSULE | Refills: 0 | Status: SHIPPED | OUTPATIENT
Start: 2020-05-09 | End: 2020-05-19

## 2020-05-09 NOTE — ED PROVIDER NOTES
Encounter Date: 5/9/2020       History     Chief Complaint   Patient presents with    Headache     PT c/o dry cough with headache x 5 days.     The history is provided by the patient.   Cough   This is a new problem. The current episode started several weeks ago. The problem occurs every few minutes. The problem has been waxing and waning. The cough is non-productive. There has been no fever. The fever has been present for less than 1 day. Associated symptoms include headaches. Pertinent negatives include no chest pain, no chills, no sweats, no weight loss, no ear congestion, no ear pain, no rhinorrhea, no sore throat, no myalgias, no shortness of breath, no wheezing and no eye redness. He has tried cough syrup for the symptoms. The treatment provided mild relief. He is a smoker. Risk factors include travel to endemic areas. His past medical history does not include bronchitis, pneumonia, bronchiectasis, COPD or emphysema.     Review of patient's allergies indicates:   Allergen Reactions    Meperidine     Pcn [penicillins] Rash     Past Medical History:   Diagnosis Date    Asthma     childhood    Heart attack     Hypertension     Tobacco use      Past Surgical History:   Procedure Laterality Date    ADENOIDECTOMY      ANKLE SURGERY      TONSILLECTOMY       Family History   Problem Relation Age of Onset    Hypertension Unknown     Coronary artery disease Unknown      Social History     Tobacco Use    Smoking status: Current Every Day Smoker     Packs/day: 1.00     Types: Cigarettes    Smokeless tobacco: Never Used   Substance Use Topics    Alcohol use: Yes    Drug use: No     Review of Systems   Constitutional: Negative for chills, fever and weight loss.   HENT: Negative for ear pain, rhinorrhea and sore throat.    Eyes: Negative for redness.   Respiratory: Positive for cough. Negative for shortness of breath and wheezing.    Cardiovascular: Negative for chest pain.   Gastrointestinal: Negative for  nausea.   Genitourinary: Negative for dysuria.   Musculoskeletal: Negative for back pain and myalgias.   Skin: Negative for rash.   Neurological: Positive for headaches. Negative for weakness.   Hematological: Does not bruise/bleed easily.   All other systems reviewed and are negative.      Physical Exam     Initial Vitals [05/09/20 1250]   BP Pulse Resp Temp SpO2   133/87 86 20 98.4 °F (36.9 °C) 100 %      MAP       --         Physical Exam    Nursing note and vitals reviewed.  Constitutional: Vital signs are normal. He appears well-developed and well-nourished. He is not diaphoretic. He is cooperative.  Non-toxic appearance. He does not have a sickly appearance. He does not appear ill. No distress.   HENT:   Head: Normocephalic. Head is without laceration.       Right Ear: External ear normal. No swelling or tenderness.   Left Ear: External ear normal. No swelling or tenderness.   TTP   Eyes: Conjunctivae and lids are normal. Lids are everted and swept, no foreign bodies found.   Neck: Trachea normal, normal range of motion, full passive range of motion without pain and phonation normal. Neck supple. No thyromegaly present.   Cardiovascular: Regular rhythm, normal heart sounds, intact distal pulses and normal pulses.   Abdominal: Soft. Normal appearance and bowel sounds are normal. He exhibits no distension, no ascites and no mass. There is no tenderness.   Lymphadenopathy:     He has no cervical adenopathy.     He has no axillary adenopathy.   Neurological: He is alert and oriented to person, place, and time. He has normal reflexes. No cranial nerve deficit or sensory deficit. GCS eye subscore is 4. GCS verbal subscore is 5. GCS motor subscore is 6.   Skin: Skin is warm, dry and intact. Capillary refill takes less than 2 seconds. No laceration, no rash and no abscess noted. No erythema.   Psychiatric: He has a normal mood and affect. His speech is normal and behavior is normal. Cognition and memory are normal.          ED Course   Procedures  Labs Reviewed   HIV 1 / 2 ANTIBODY   SARS-COV-2 RNA AMPLIFICATION, QUAL          Imaging Results    None                    Results for orders placed or performed during the hospital encounter of 05/09/20   COVID-19 Rapid Screening   Result Value Ref Range    SARS-CoV-2 RNA, Amplification, Qual Negative Negative                      All historical, clinical, radiographic, and laboratory findings were reviewed with the patient in detail.  Findings are consistent with a diagnosis of COVIC 19 negative, Regarding SINUSITIS, for treatment, encouraged patient to refrain from smoking, drink plenty of fluids, rest, take medications as prescribed, and to use a humidifier or steam in the bathroom. Patient instructed to notify primary care provider if: they have a cough most days or have a cough that returns frequently; are coughing up blood; have a high fever or shaking chills; have a low-grade fever for three or more days; develop thick, greenish mucus, especially if it has a bad smell; and feel short of breath or have chest pain, or return to emergency department for further evaluation. Also recommended that the patient shower in the morning and evening to wash away any allergens and help reduce the production of mucus, avoid taking decongestants if diagnosed with hypertension.  If decongestants were recommended, advised patient to not take for longer than 5 days to help prevent rebound congestion. For prevention, discussed with patient the importance of not smoking, getting annual influenza vaccines, reducing exposure to air pollution, and to frequently wash hands to avoid spread of infection. Instructed patient to follow up with primary care provider.  Your test was NEGATIVE for COVID-19 (coronavirus).  If you still have symptoms, treat with rest, fluids, and over-the-counter medications.  Continue to stay home and practice proper handwashing.     If your symptoms worsen or if you have any  other concerns, please contact [facility] at [phone].     Sincerely,    Liborio Vaughn NP  sinus headache and sinusitis.  All remaining questions and concerns were addressed at that time.  Patient has been counseled regarding the need for follow-up as well as the indication to return to the emergency room should new or worrisome developments occur.            Clinical Impression:       ICD-10-CM ICD-9-CM   1. Sinus headache R51 784.0   2. Acute non-recurrent frontal sinusitis J01.10 461.1   3. Covid-19 Virus not Detected [id number]                                 Liborio Vaughn NP  05/09/20 1343

## 2020-05-09 NOTE — ED NOTES
Patient examined by NP. Patient educated on discharge prescriptions and instructions by NP. No assistance needed by Rn. Patient discharged to lobby by NP.

## 2020-05-10 LAB — HIV 1+2 AB+HIV1 P24 AG SERPL QL IA: NEGATIVE

## 2020-07-03 ENCOUNTER — HOSPITAL ENCOUNTER (EMERGENCY)
Facility: HOSPITAL | Age: 30
Discharge: HOME | End: 2020-07-03
Attending: EMERGENCY MEDICINE
Payer: MEDICAID

## 2020-07-03 VITALS
DIASTOLIC BLOOD PRESSURE: 92 MMHG | OXYGEN SATURATION: 100 % | WEIGHT: 196.13 LBS | HEIGHT: 75 IN | TEMPERATURE: 98 F | RESPIRATION RATE: 20 BRPM | HEART RATE: 74 BPM | BODY MASS INDEX: 24.39 KG/M2 | SYSTOLIC BLOOD PRESSURE: 144 MMHG

## 2020-07-03 DIAGNOSIS — Z53.21 PATIENT LEFT WITHOUT BEING SEEN: Primary | ICD-10-CM

## 2020-07-03 PROCEDURE — 99281 EMR DPT VST MAYX REQ PHY/QHP: CPT

## 2020-07-23 NOTE — ED PROVIDER NOTES
Encounter Date: 7/3/2020    Patient left without being seen.  I had no encounter with the patient.       Parth Raymundo MD  07/23/20 4414

## 2020-09-01 ENCOUNTER — HOSPITAL ENCOUNTER (EMERGENCY)
Facility: HOSPITAL | Age: 30
Discharge: HOME | End: 2020-09-01
Attending: EMERGENCY MEDICINE
Payer: MEDICAID

## 2020-09-01 VITALS
HEART RATE: 89 BPM | OXYGEN SATURATION: 98 % | SYSTOLIC BLOOD PRESSURE: 132 MMHG | DIASTOLIC BLOOD PRESSURE: 82 MMHG | TEMPERATURE: 98 F | RESPIRATION RATE: 16 BRPM

## 2020-09-01 DIAGNOSIS — Z00.8 MEDICAL CLEARANCE FOR INCARCERATION: Primary | ICD-10-CM

## 2020-09-01 LAB — SARS-COV-2 RDRP RESP QL NAA+PROBE: NEGATIVE

## 2020-09-01 PROCEDURE — U0002 COVID-19 LAB TEST NON-CDC: HCPCS

## 2020-09-01 PROCEDURE — 99282 EMERGENCY DEPT VISIT SF MDM: CPT

## 2020-09-01 NOTE — ED NOTES
Patient examined, evaluated, and educated on discharge instructions and prescriptions by MODESTO Vela without nursing assistance. Patient discharged to [facility] by MD.

## 2020-09-01 NOTE — ED PROVIDER NOTES
SCRIBE #1 NOTE: I, Keara Houston, am scribing for, and in the presence of, Krishan Vela MD. I have scribed the entire note.       History     Chief Complaint   Patient presents with    COVID-19 Concerns     testing prior to long term transport     Review of patient's allergies indicates:   Allergen Reactions    Meperidine     Keflex [cephalexin] Rash    Pcn [penicillins] Rash         History of Present Illness     HPI    9/1/2020, 11:12 AM  History obtained from the patient      History of Present Illness: Reji Talbert Jr. is a 30 y.o. male patient with a PMHx of asthma, HTN, Tobacco use, Heart attack who presents to the Emergency Department for COVID-19 concerns. Pt needs to be tested for COVID before being transported to halfway. Pt reports rhinorrhea, cough, and Sob for the past several days. Symptoms are constant and moderate in severity. No mitigating or exacerbating factors reported. Patient denies any n/v/d, fever, chills, HA, sore throat, weakness, CP, and all other sxs at this time. No prior Tx reported. Pt notes that he was recently exposed to his aunt, who had tested positive for COVID. No further complaints or concerns at this time.       Arrival mode: Police/snf Transportation    PCP: Primary Doctor No        Past Medical History:  Past Medical History:   Diagnosis Date    Asthma     childhood    Heart attack     Hypertension     Tobacco use        Past Surgical History:  Past Surgical History:   Procedure Laterality Date    ADENOIDECTOMY      ANKLE SURGERY      TONSILLECTOMY           Family History:  Family History   Problem Relation Age of Onset    Hypertension Unknown     Coronary artery disease Unknown        Social History:  Social History     Tobacco Use    Smoking status: Current Every Day Smoker     Packs/day: 1.00     Types: Cigarettes    Smokeless tobacco: Never Used   Substance and Sexual Activity    Alcohol use: Yes    Drug use: No    Sexual activity:  Yes     Partners: Female        Review of Systems     Review of Systems   Constitutional: Negative for chills and fever.   HENT: Positive for rhinorrhea. Negative for sore throat.    Respiratory: Positive for cough and shortness of breath.    Cardiovascular: Negative for chest pain.   Gastrointestinal: Negative for diarrhea, nausea and vomiting.   Genitourinary: Negative for dysuria.   Musculoskeletal: Negative for back pain.   Skin: Negative for rash.   Neurological: Negative for weakness and headaches.   Hematological: Does not bruise/bleed easily.   All other systems reviewed and are negative.     Physical Exam     Initial Vitals [09/01/20 1028]   BP Pulse Resp Temp SpO2   132/82 89 16 98 °F (36.7 °C) 98 %      MAP       --          Physical Exam  Nursing Notes and Vital Signs Reviewed.  Constitutional: Patient is in no apparent distress. Well-developed and well-nourished.  Head: Atraumatic. Normocephalic.  Eyes: PERRL. EOM intact. Conjunctivae are not pale. No scleral icterus.  ENT: Mucous membranes are moist. Oropharynx is clear and symmetric.    Neck: Supple. Full ROM. No lymphadenopathy.  Cardiovascular: Regular rate. Regular rhythm. No murmurs, rubs, or gallops. Distal pulses are 2+ and symmetric.  Pulmonary/Chest: No respiratory distress. Clear to auscultation bilaterally. No wheezing or rales.  Abdominal: Soft and non-distended.  There is no tenderness.  No rebound, guarding, or rigidity.  Musculoskeletal: Moves all extremities. No obvious deformities. No edema. No calf tenderness.  Skin: Warm and dry.  Neurological:  Alert, awake, and appropriate.  Normal speech.  No acute focal neurological deficits are appreciated.  Psychiatric: Normal affect. Good eye contact. Appropriate in content.     ED Course   Procedures  ED Vital Signs:  Vitals:    09/01/20 1028   BP: 132/82   Pulse: 89   Resp: 16   Temp: 98 °F (36.7 °C)   TempSrc: Oral   SpO2: 98%       Abnormal Lab Results:  Labs Reviewed   SARS-COV-2 RNA  AMPLIFICATION, QUAL        All Lab Results:  Results for orders placed or performed during the hospital encounter of 09/01/20   COVID-19 Rapid Screening   Result Value Ref Range    SARS-CoV-2 RNA, Amplification, Qual Negative Negative                  The Emergency Provider reviewed the vital signs and test results, which are outlined above.     ED Discussion     11:23 AM: Reassessed pt at this time. Discussed with pt all pertinent ED information and results. Discussed pt dx and plan of tx. Gave pt all f/u and return to the ED instructions. All questions and concerns were addressed at this time. Pt expresses understanding of information and instructions, and is comfortable with plan to discharge. Pt is stable for discharge.    I discussed with patient and/or family/caretaker that evaluation in the ED does not suggest any emergent or life threatening medical conditions requiring immediate intervention beyond what was provided in the ED, and I believe patient is safe for discharge.  Regardless, an unremarkable evaluation in the ED does not preclude the development or presence of a serious of life threatening condition. As such, patient was instructed to return immediately for any worsening or change in current symptoms.       Medical Decision Making:   Clinical Tests:   Lab Tests: Ordered and Reviewed           ED Medication(s):  Medications - No data to display    Discharge Medication List as of 9/1/2020 11:09 AM          Follow-up Information     PCP. Call in 1 day.    Why: As needed                     Scribe Attestation:   Scribe #1: I performed the above scribed service and the documentation accurately describes the services I performed. I attest to the accuracy of the note.     Attending:   Physician Attestation Statement for Scribe #1: I, Krishan Vela MD, personally performed the services described in this documentation, as scribed by Keara Houston, in my presence, and it is both accurate and  complete.           Clinical Impression       ICD-10-CM ICD-9-CM   1. Medical clearance for incarceration  Z00.8 V70.8       Disposition:   Disposition: Discharged  Condition: Stable         Krishan Vela MD  09/01/20 1401

## 2020-09-01 NOTE — DISCHARGE INSTRUCTIONS
Your test was NEGATIVE for COVID-19 (coronavirus).      You may leave home and/or return to work when the following conditions are met:  24 hours fever free without fever-reducing medications AND  Improved symptoms      If your symptoms worsen or if you have any other concerns, please contact [facility] at [phone].     Sincerely,    Krishan Vela MD

## 2020-09-01 NOTE — ED NOTES
Bed: [facility] 02  Expected date:   Expected time:   Means of arrival:   Comments:

## 2021-05-06 ENCOUNTER — PATIENT MESSAGE (OUTPATIENT)
Dept: RESEARCH | Facility: HOSPITAL | Age: 31
End: 2021-05-06